# Patient Record
Sex: FEMALE | URBAN - METROPOLITAN AREA
[De-identification: names, ages, dates, MRNs, and addresses within clinical notes are randomized per-mention and may not be internally consistent; named-entity substitution may affect disease eponyms.]

---

## 2020-07-06 ENCOUNTER — APPOINTMENT (RX ONLY)
Dept: URBAN - METROPOLITAN AREA CLINIC 153 | Facility: CLINIC | Age: 60
Setting detail: DERMATOLOGY
End: 2020-07-06

## 2020-07-06 PROBLEM — Z41.8 ENCOUNTER FOR OTHER PROCEDURES FOR PURPOSES OTHER THAN REMEDYING HEALTH STATE: Status: ACTIVE | Noted: 2020-07-06

## 2020-07-06 PROCEDURE — ? MEDICAL CONSULTATION: COOLSCULPTING

## 2020-07-06 NOTE — HPI: BODY SCULPTING CONSULTATION
Have You Had Body Sculpting Treatments Before?: has not had a previous body sculpting treatment
Additional History: CoolSculpting consultation

## 2020-08-12 ENCOUNTER — APPOINTMENT (RX ONLY)
Dept: URBAN - METROPOLITAN AREA CLINIC 153 | Facility: CLINIC | Age: 60
Setting detail: DERMATOLOGY
End: 2020-08-12

## 2020-08-12 PROBLEM — Z41.8 ENCOUNTER FOR OTHER PROCEDURES FOR PURPOSES OTHER THAN REMEDYING HEALTH STATE: Status: ACTIVE | Noted: 2020-08-12

## 2020-08-12 PROCEDURE — ? COOLSCULPTING

## 2020-08-12 NOTE — PROCEDURE: COOLSCULPTING
Device: Coolsculpting
Price (Use Numbers Only, No Special Characters Or $): 9861.46
Location 2: lower abdomen (right)
Suction Settings: The suction settings were per protocol.
Applicator Size: CoolAdvantage Core
Applicator Size: CoolAdvantage Curve Plus
Location 1: lower abdomen (left)
Intro: Prior to treatment, the area was cleaned with alcohol and marked out with a marking pen. The gel sheet was then applied uniformly. The applicator was applied to the skin with good contact and suction.
Time (Minutes - Will Only Render If Nonzero): 35
Applicator Size: CoolMini applicator
Time (Minutes - Will Only Render If Nonzero): 45
Post Treatment: After treatment, the suction was turned off, and the applicator was removed from the skin.
Suction Settings: The suction settings were per protocol. 60-75-60
Location 4: upper abdomen (right)
Consent: Written consent obtained, risks reviewed including, but not limited to, blistering from suction, darker or lighter pigmentary change, bruising, and/or need for multiple treatments.
Detail Level: Zone
Location 3: upper abdomen (left)

## 2020-09-14 ENCOUNTER — APPOINTMENT (RX ONLY)
Dept: URBAN - METROPOLITAN AREA CLINIC 153 | Facility: CLINIC | Age: 60
Setting detail: DERMATOLOGY
End: 2020-09-14

## 2020-09-14 PROBLEM — Z41.8 ENCOUNTER FOR OTHER PROCEDURES FOR PURPOSES OTHER THAN REMEDYING HEALTH STATE: Status: ACTIVE | Noted: 2020-09-14

## 2020-09-14 PROCEDURE — ? MEDICAL CONSULTATION: COOLSCULPTING

## 2020-09-14 PROCEDURE — 99211 OFF/OP EST MAY X REQ PHY/QHP: CPT

## 2020-09-14 NOTE — HPI: BODY SCULPTING CONSULTATION
previous_has_had_a_previous_body_sculpting_treatment
When Was Your Last Treatment?: 08/12/2020
Additional History: 1 month follow up

## 2020-12-11 ENCOUNTER — APPOINTMENT (RX ONLY)
Dept: URBAN - METROPOLITAN AREA CLINIC 153 | Facility: CLINIC | Age: 60
Setting detail: DERMATOLOGY
End: 2020-12-11

## 2020-12-11 PROBLEM — Z41.8 ENCOUNTER FOR OTHER PROCEDURES FOR PURPOSES OTHER THAN REMEDYING HEALTH STATE: Status: ACTIVE | Noted: 2020-12-11

## 2020-12-11 PROCEDURE — 99211 OFF/OP EST MAY X REQ PHY/QHP: CPT

## 2020-12-11 PROCEDURE — ? MEDICAL CONSULTATION: COOLSCULPTING

## 2020-12-11 NOTE — HPI: BODY SCULPTING CONSULTATION
previous_has_had_a_previous_body_sculpting_treatment
When Was Your Last Treatment?: 08/12/2020
Additional History: 3 months follow up

## 2022-04-29 ENCOUNTER — OFFICE VISIT (OUTPATIENT)
Dept: URBAN - NONMETROPOLITAN AREA CLINIC 18 | Facility: CLINIC | Age: 62
End: 2022-04-29
Payer: COMMERCIAL

## 2022-04-29 DIAGNOSIS — H04.123 DRY EYE SYNDROME OF BILATERAL LACRIMAL GLANDS: ICD-10-CM

## 2022-04-29 DIAGNOSIS — H02.889 MEIBOMIAN GLAND DYSFUNCTION OF EYE: ICD-10-CM

## 2022-04-29 DIAGNOSIS — H25.13 AGE-RELATED NUCLEAR CATARACT, BILATERAL: ICD-10-CM

## 2022-04-29 DIAGNOSIS — H52.4 PRESBYOPIA: Primary | ICD-10-CM

## 2022-04-29 DIAGNOSIS — E11.9 TYPE 2 DIABETES MELLITUS W/O COMPLICATION: ICD-10-CM

## 2022-04-29 PROCEDURE — 92004 COMPRE OPH EXAM NEW PT 1/>: CPT | Performed by: OPTOMETRIST

## 2022-04-29 ASSESSMENT — INTRAOCULAR PRESSURE
OD: 16
OS: 16

## 2022-04-29 ASSESSMENT — VISUAL ACUITY
OD: 20/20
OS: 20/20

## 2022-04-29 NOTE — IMPRESSION/PLAN
Impression: Meibomian gland dysfunction of eye: H02.889. Plan: Discussed lid hygiene and warm compresses.

## 2022-04-29 NOTE — IMPRESSION/PLAN
Impression: Type 2 diabetes mellitus w/o complication: W85.9. Plan: Reviewed with patient that no retinal vascular changes are occurring from diabetes. Patient to continue care with current medical provider for diabetes management. Importance of retinal exams reviewed. Will continue to observe with dilated examination in 12 months.

## 2023-05-02 ENCOUNTER — OFFICE VISIT (OUTPATIENT)
Dept: URBAN - NONMETROPOLITAN AREA CLINIC 18 | Facility: CLINIC | Age: 63
End: 2023-05-02
Payer: COMMERCIAL

## 2023-05-02 DIAGNOSIS — H43.813 VITREOUS DEGENERATION, BILATERAL: ICD-10-CM

## 2023-05-02 DIAGNOSIS — H04.123 DRY EYE SYNDROME OF BILATERAL LACRIMAL GLANDS: ICD-10-CM

## 2023-05-02 DIAGNOSIS — H25.13 AGE-RELATED NUCLEAR CATARACT, BILATERAL: ICD-10-CM

## 2023-05-02 DIAGNOSIS — E11.9 TYPE 2 DIABETES MELLITUS W/O COMPLICATION: ICD-10-CM

## 2023-05-02 DIAGNOSIS — H52.4 PRESBYOPIA: Primary | ICD-10-CM

## 2023-05-02 PROCEDURE — 92014 COMPRE OPH EXAM EST PT 1/>: CPT | Performed by: OPTOMETRIST

## 2023-05-02 ASSESSMENT — INTRAOCULAR PRESSURE
OD: 16
OS: 16

## 2023-05-02 NOTE — IMPRESSION/PLAN
Impression: Type 2 diabetes mellitus w/o complication: J30.1. Plan: Reviewed with patient that no retinal vascular changes are occurring from diabetes. Patient to continue care with current medical provider for diabetes management. Importance of retinal exams reviewed. Will continue to observe with dilated examination in 12 months.

## 2023-05-02 NOTE — IMPRESSION/PLAN
Impression: Dry eye syndrome of bilateral lacrimal glands: H04.123. Plan: Discussed condition with patient in detail. Recommend treatment with gel drop QHS OU. RTC if symptoms continue.

## 2024-05-07 ENCOUNTER — OFFICE VISIT (OUTPATIENT)
Dept: URBAN - NONMETROPOLITAN AREA CLINIC 18 | Facility: CLINIC | Age: 64
End: 2024-05-07
Payer: COMMERCIAL

## 2024-05-07 DIAGNOSIS — H43.813 VITREOUS DEGENERATION, BILATERAL: ICD-10-CM

## 2024-05-07 DIAGNOSIS — H25.813 COMBINED FORMS OF AGE-RELATED CATARACT, BILATERAL: ICD-10-CM

## 2024-05-07 DIAGNOSIS — H52.4 PRESBYOPIA: Primary | ICD-10-CM

## 2024-05-07 DIAGNOSIS — E11.9 TYPE 2 DIABETES MELLITUS W/O COMPLICATION: ICD-10-CM

## 2024-05-07 PROCEDURE — 92014 COMPRE OPH EXAM EST PT 1/>: CPT | Performed by: OPTOMETRIST

## 2024-05-07 ASSESSMENT — INTRAOCULAR PRESSURE
OD: 15
OS: 12

## 2025-05-13 ENCOUNTER — OFFICE VISIT (OUTPATIENT)
Dept: URBAN - NONMETROPOLITAN AREA CLINIC 18 | Facility: CLINIC | Age: 65
End: 2025-05-13
Payer: COMMERCIAL

## 2025-05-13 DIAGNOSIS — H35.443 AGE-RELATED RETICULAR DEGENERATION OF RETINA, BILATERAL: ICD-10-CM

## 2025-05-13 DIAGNOSIS — R73.03 BORDERLINE DIABETES: ICD-10-CM

## 2025-05-13 DIAGNOSIS — H25.813 COMBINED FORMS OF AGE-RELATED CATARACT, BILATERAL: ICD-10-CM

## 2025-05-13 DIAGNOSIS — H43.813 VITREOUS DEGENERATION, BILATERAL: ICD-10-CM

## 2025-05-13 DIAGNOSIS — H52.03 HYPERMETROPIA, BILATERAL: Primary | ICD-10-CM

## 2025-05-13 PROCEDURE — 92014 COMPRE OPH EXAM EST PT 1/>: CPT | Performed by: OPTOMETRIST

## 2025-05-13 ASSESSMENT — INTRAOCULAR PRESSURE
OS: 14
OD: 15